# Patient Record
Sex: MALE | Race: WHITE | Employment: FULL TIME | URBAN - METROPOLITAN AREA
[De-identification: names, ages, dates, MRNs, and addresses within clinical notes are randomized per-mention and may not be internally consistent; named-entity substitution may affect disease eponyms.]

---

## 2018-11-27 PROCEDURE — 88307 TISSUE EXAM BY PATHOLOGIST: CPT | Performed by: SURGERY

## 2018-12-10 PROBLEM — D03.61 MELANOMA IN SITU OF RIGHT UPPER ARM (HCC): Status: ACTIVE | Noted: 2018-12-10

## 2024-12-09 NOTE — H&P (VIEW-ONLY)
Orthopaedic Foot & Ankle Surgery  [facility]  Patient:  Chad Roper   :  1960 - 64 year old male   DULANDRY Medical Record: [id number]   Date of Service:  2024   CHIEF COMPLAINT / REASON FOR VISIT   This is a NEW PATIENT VISIT for RIGHT ANKLE and HINDFOOT PAIN  Patient presents with:  Follow - Up: PREOP-Patient is scheduled for RIGHT FOOT RECONSTRUCTION WITH SUBTALAR AND TARSOMETATARSAL ARTHRODESIS, DEEP TENDON TRANSFER AND GASTROCNEMIUS RECESSION 2024 @ [facility].       History of Present Illness   The patient describes their complaint as follows:   Location: POSTERIOR, MEDIAL ANKLE  Quality: SHARP, THROBBING, SHOOTING  Severity: Baseline of 3/10 ranging between 0/10 to 7/10   Duration: Began 6 WEEK(S) ago. Onset associated with  INCREASING WITH ACTIVITY, MOWING THE LAWN CAUSED SIGNIFICANT TRANSIENT INCREASE RECENTLY  Timing: ACTIVITY RELATED   Context: The location has been WITHOUT CHANGE. The quality has been WITHOUT CHANGE. The severity has been WAXING AND WANING  Modifying Factors: improves with REST AND AVOIDING ACTIVITIES THAT CAUSE PAIN,worsens with DAILY ACTIVITIES, PARTICULARLY WALKING, TRAVERSING UNEVEN GROUND OUTDOORS, RECREATION AND EXERCISE.  Associated Signs/Symptoms:  SWELLING and TENDERNESS also occurs with this complaint.  Interval History for 2024 visit   Since the last evaluation 1 MONTH(S) ago, the following changes in the HPI were reported:  Improved, with reduced pain, swelling and better function  Still uses cam boot, has otc orthotics  Interval History for 2024 visit   Since the last evaluation 3 MONTH(S) ago, the following changes in the HPI were reported:  Recent PT note documents:  Pain: 7/10 with yardwork; 4/10 with 1-2 mile walks  Pt. Reports: Still has the pain in the sinus tarsi region of the right foot. It is not as much, possibly due to decreased frequency of the exercises. Feels it when taking walks, and limps at the end of the day. Worked in  the yard all day and pain level increased. The   Activities: health club (hasn't been in 3 months) - stair master, weight-lifting, walking on track for 1 mile  He also notes the flattening of the foot rubs against the orthotic at he posterior aspect of the navicular pad. NO longer has pain along PT tendon, it is all in the sinus tarsi  Interval History for 12/9/2024 visit   Since the last evaluation 1 WEEK(S) ago, the following changes in the HPI were reported:  Continues to experience pain and swelling with daily activities in the sinus tarsi  Back to consider options    Past Medical History     Past Medical History:   Diagnosis Date   • Melanoma in situ (HCC) 2018    right arm  Bro   • Other and unspecified hyperlipidemia      HISTORY OF VENOUS THROMBOEMBOLIC DISEASE: NONE REPORTED  Past Surgical History     Past Surgical History:   Procedure Laterality Date   • OTHER SURGICAL HISTORY  2005    knee arthroscopy   • VASECTOMY       Social and Family History   Social History    Tobacco Use      Smoking status: Never      Smokeless tobacco: Never    Alcohol use: Yes      Alcohol/week: 12.5 standard drinks of alcohol      Types: 15 Cans of beer per week    Drug use: No    Family History   Problem Relation Age of Onset   • Heart Disorder Father         valve surgey   • Other (Other) Father         PUD   • Cancer Mother         thyroid     Current Medications     Current Outpatient Medications   Medication Sig Dispense Refill   • rosuvastatin 40 MG Oral Tab Take 1 tablet (40 mg total) by mouth nightly. 90 tablet 3   • ibuprofen 200 MG Oral Tab Take 200 mg by mouth every 6 (six) hours as needed for Pain. (Patient not taking: Reported on 12/9/2024)     • Desoximetasone 0.25 % External Cream Apply to aa's on the hands BID PRN (Patient not taking: Reported on 12/9/2024) 120 g 1     Allergies     Biaxin [Clarithromy*    ANAPHYLAXIS, SWELLING  Review Of Systems   10 point review of systems was noncontributory except for  pertinent positives and negatives noted in the the HPI.  Physical Examination   VITALS: @ BMI: Estimated body mass index is 28.49 kg/m² as calculated from the following:    Height as of 6/28/23: 5' 10.25\" (1.784 m).    Weight as of 8/7/24: 200 lb (90.7 kg).  Constitutional: Patient is well-developed, well-nourished, and in no distress.   HENT: Normocephalic and atraumatic. Moist mucous membranes   Eyes: Clear Conjunctivae without discharge, bilateral. No scleral icterus, bilateral.   Neck: Neck supple. No JVD, Tracheal deviation visible or thyromegaly visible.  Cardiovascular: Normal pulses  Pulmonary/Chest: Effort normal. No audible stridor or wheezes  No respiratory distress  Abdominal: No visible distension  Neurological: Alert and oriented to person, place, and time. GCS score is 15.   Skin: Warm, dry Normal turgor non-diaphoretic. No rashes. No erythema. No pallor.   Psychiatric: Mood, memory, affect and judgment normal.    MUSCULOSKELETAL: The affected extremity was examined with/without the contralateral for comparison:  POSTERIOR TIBIAL TENDON RIGHT   Foot Alignment Pes Planovalgus   Foot Deformity Fully Correctable   Synovitis / Swelling  MILD   Sinus Tarsi Impingement NONE   CalcaneoFibular Abutment NONE   Tenderness    Navicular Tuberosity Non-Tender to Palpation   Inframalleolar Non-Tender to Palpation   Retromalleolar Tender to Palpation   Posteromedial Tibia Exquisite Tenderness   Strength    Inversion DIMINISHED 4/5   Pain    Pain-Resisted Inversion MODERATE   Pain-Heel Rise MODERATE   Specific Tests    Inversion on  Heel Rise No   Silfverskiold Test Gastrocnemius Contracture       NEUROVASCULAR EXAM RIGHT   Pulses    Dorsalis Pedis Palpable Strong 2/2   Posterior Tibal Palpable Strong 2/2   Sensation    Foot Intact throughout   Ankle Intact throughout   Plantar Sensation N/A     Interval Physical Exam for 8/28/2024 visit   Since the last evaluation 1 MONTH(S) ago, the following changes in the  Physical Examination have occurred:  Mild to minimal tenderness and swelling  NO pain on SHR  Interval Physical Exam for 12/2/2024 visit   Since the last evaluation 3 MONTH(S) ago, the following changes in the Physical Examination have occurred:  NO changes although visibly, there is significant abduction and arch collapse when standing  Interval Physical Exam for 12/9/2024 visit   Since the last evaluation 1 WEEK(S) ago, the following changes in the Physical Examination have occurred:  Severe foot deformity, increased hindfoot valgus, midfoot abduction and medial column varus.  No tenderness at PT tendon, but no function   2-3/5 inversion strength when isolating out recruitment of AT tendon    XRAY & ADVANCED IMAGING INTERPRETATION    Independent Interpretation of Prior Imaging: NO PRIOR IMAGING STUDIES WERE REVIEWED    XR FOOT (2 VIEW), RIGHT (CPT=73620)  I ordered and reviewed the following imaging studies: Foot 2 views FWB   Right  Finding(s):  Pes Planovaglus Foot Deformity - Moderate to severe with arch   flattening and abduction and First TMT Joint Subluxation - dorsal   translation moderate  Interpretation(s):  Deformity - moderate to severe pes planovalgus with   arch depression and midfoot abduction    DIAGNOSES:   Diagnoses and all orders for this visit:    Posterior tibial tendon dysfunction, right  -     MISC DURABLE MEDICAL EQUIP  -     DME - EXTERNAL ; Future    Deformity of right foot    Gastrocnemius equinus of right lower extremity    Instability of right foot joint      DME dispensed at this encounter - Immobilization type: N/A NO ASSISTIVE AIDS  PATIENT ASSESSMENT & MEDICAL DECISION-MAKING:   WE DISCUSSED RECONSTRUCTIVE SURGERY OF THE FOOT TO CORRECT THE ALIGNMENT, REDUCE/ELIMINATE PAIN AND IMPROVE FUNCTION.  STJ and TMTJ1 realignment and arthrodesis, FDL to navicular tendon transfer and gastrocnemius recession    We had a long, detailed and individualized discussion of non-operative and  operative options.The indications for treatment and  the suitability of each option for the patient's condition and goals of treatment were included  Surgery was discussed - risks and benefits, pros and cons time course for healing and functional recovery and expectations for the final outcome. I specifically explained the risks of wound and bone healing difficulties, infection, venous thromboembolism and how the postoperative care plan intends to minimize their occurrence.  In my assessment, reconstructive foot surgery  is the most appropriate option  This recommendation will be considered and patient will return at their choosing for further care      The total time I spent during today's encounter was more than 30 minutes. This time includes review of medical records, interview and examination, independently interpreting results, counseling and education, ordering medications, tests and/or procedures, coordination and/or communicating with other medical providers and documenting the encounter in the EMR. All the patient's questions were answered and the patient voiced understanding of their health issues and a willingness to proceed with the treatment we agreed upon.          Chu Eduardo MD, [facility] Certified Orthopedic Surgeon - Foot & Ankle Subspecialist  [phone] Scheduling Line    I, Chu Eduardo MD performed all aspects of the evaluation and management  of this encounter. I diagnosed the patient and formulated the treatment options. This information was presented to and discussed with the patient including risk and benefits. All questions were answered and the patient acknowledged understanding The patient, with my input, determined how to proceed.

## 2024-12-11 RX ORDER — IBUPROFEN 200 MG
200 TABLET ORAL EVERY 6 HOURS PRN
COMMUNITY

## 2024-12-11 RX ORDER — ROSUVASTATIN CALCIUM 40 MG/1
40 TABLET, COATED ORAL NIGHTLY
COMMUNITY

## 2024-12-30 ENCOUNTER — ANESTHESIA EVENT (OUTPATIENT)
Dept: SURGERY | Facility: HOSPITAL | Age: 64
End: 2024-12-30
Payer: COMMERCIAL

## 2024-12-31 ENCOUNTER — HOSPITAL ENCOUNTER (OUTPATIENT)
Facility: HOSPITAL | Age: 64
Discharge: HOME | End: 2025-01-01
Attending: ORTHOPAEDIC SURGERY | Admitting: ORTHOPAEDIC SURGERY
Payer: COMMERCIAL

## 2024-12-31 ENCOUNTER — APPOINTMENT (OUTPATIENT)
Dept: GENERAL RADIOLOGY | Facility: HOSPITAL | Age: 64
End: 2024-12-31
Attending: ORTHOPAEDIC SURGERY
Payer: COMMERCIAL

## 2024-12-31 ENCOUNTER — ANESTHESIA (OUTPATIENT)
Dept: SURGERY | Facility: HOSPITAL | Age: 64
End: 2024-12-31
Payer: COMMERCIAL

## 2024-12-31 PROBLEM — M76.821 POSTERIOR TIBIAL TENDON DYSFUNCTION, RIGHT: Status: ACTIVE | Noted: 2024-12-31

## 2024-12-31 PROCEDURE — 0SGH07Z FUSION OF RIGHT TARSAL JOINT WITH AUTOLOGOUS TISSUE SUBSTITUTE, OPEN APPROACH: ICD-10-PCS | Performed by: ORTHOPAEDIC SURGERY

## 2024-12-31 PROCEDURE — 76000 FLUOROSCOPY <1 HR PHYS/QHP: CPT | Performed by: ORTHOPAEDIC SURGERY

## 2024-12-31 PROCEDURE — 0SGH04Z FUSION OF RIGHT TARSAL JOINT WITH INTERNAL FIXATION DEVICE, OPEN APPROACH: ICD-10-PCS | Performed by: ORTHOPAEDIC SURGERY

## 2024-12-31 PROCEDURE — 0L8N0ZZ DIVISION OF RIGHT LOWER LEG TENDON, OPEN APPROACH: ICD-10-PCS | Performed by: ORTHOPAEDIC SURGERY

## 2024-12-31 PROCEDURE — 0LXV0ZZ TRANSFER RIGHT FOOT TENDON, OPEN APPROACH: ICD-10-PCS | Performed by: ORTHOPAEDIC SURGERY

## 2024-12-31 PROCEDURE — 76942 ECHO GUIDE FOR BIOPSY: CPT | Performed by: ANESTHESIOLOGY

## 2024-12-31 PROCEDURE — 0SGK04Z FUSION OF RIGHT TARSOMETATARSAL JOINT WITH INTERNAL FIXATION DEVICE, OPEN APPROACH: ICD-10-PCS | Performed by: ORTHOPAEDIC SURGERY

## 2024-12-31 DEVICE — K WIRE 2X150MM TRCR PT SS: Type: IMPLANTABLE DEVICE | Site: FOOT

## 2024-12-31 DEVICE — IMPLANTABLE DEVICE: Type: IMPLANTABLE DEVICE | Site: FOOT

## 2024-12-31 DEVICE — IMPLANTABLE DEVICE: Type: IMPLANTABLE DEVICE | Site: FOOT | Status: FUNCTIONAL

## 2024-12-31 RX ORDER — SODIUM PHOSPHATE, DIBASIC AND SODIUM PHOSPHATE, MONOBASIC 7; 19 G/230ML; G/230ML
1 ENEMA RECTAL ONCE AS NEEDED
Status: DISCONTINUED | OUTPATIENT
Start: 2024-12-31 | End: 2025-01-01

## 2024-12-31 RX ORDER — HYDROCODONE BITARTRATE AND ACETAMINOPHEN 5; 325 MG/1; MG/1
1 TABLET ORAL ONCE AS NEEDED
Status: DISCONTINUED | OUTPATIENT
Start: 2024-12-31 | End: 2024-12-31 | Stop reason: HOSPADM

## 2024-12-31 RX ORDER — HYDROMORPHONE HYDROCHLORIDE 1 MG/ML
0.2 INJECTION, SOLUTION INTRAMUSCULAR; INTRAVENOUS; SUBCUTANEOUS EVERY 5 MIN PRN
Status: DISCONTINUED | OUTPATIENT
Start: 2024-12-31 | End: 2024-12-31 | Stop reason: HOSPADM

## 2024-12-31 RX ORDER — NALOXONE HYDROCHLORIDE 0.4 MG/ML
0.08 INJECTION, SOLUTION INTRAMUSCULAR; INTRAVENOUS; SUBCUTANEOUS AS NEEDED
Status: DISCONTINUED | OUTPATIENT
Start: 2024-12-31 | End: 2024-12-31 | Stop reason: HOSPADM

## 2024-12-31 RX ORDER — ACETAMINOPHEN 325 MG/1
650 TABLET ORAL EVERY 4 HOURS PRN
Status: DISCONTINUED | OUTPATIENT
Start: 2024-12-31 | End: 2025-01-01

## 2024-12-31 RX ORDER — POLYETHYLENE GLYCOL 3350 17 G/17G
17 POWDER, FOR SOLUTION ORAL DAILY PRN
Status: DISCONTINUED | OUTPATIENT
Start: 2024-12-31 | End: 2025-01-01

## 2024-12-31 RX ORDER — KETOROLAC TROMETHAMINE 30 MG/ML
INJECTION, SOLUTION INTRAMUSCULAR; INTRAVENOUS AS NEEDED
Status: DISCONTINUED | OUTPATIENT
Start: 2024-12-31 | End: 2024-12-31 | Stop reason: SURG

## 2024-12-31 RX ORDER — DEXAMETHASONE SODIUM PHOSPHATE 4 MG/ML
VIAL (ML) INJECTION AS NEEDED
Status: DISCONTINUED | OUTPATIENT
Start: 2024-12-31 | End: 2024-12-31 | Stop reason: SURG

## 2024-12-31 RX ORDER — HYDROMORPHONE HYDROCHLORIDE 1 MG/ML
0.6 INJECTION, SOLUTION INTRAMUSCULAR; INTRAVENOUS; SUBCUTANEOUS EVERY 5 MIN PRN
Status: DISCONTINUED | OUTPATIENT
Start: 2024-12-31 | End: 2024-12-31 | Stop reason: HOSPADM

## 2024-12-31 RX ORDER — ACETAMINOPHEN 500 MG
1000 TABLET ORAL ONCE
Status: DISCONTINUED | OUTPATIENT
Start: 2024-12-31 | End: 2024-12-31 | Stop reason: HOSPADM

## 2024-12-31 RX ORDER — DOCUSATE SODIUM 100 MG/1
100 CAPSULE, LIQUID FILLED ORAL 2 TIMES DAILY
Status: DISCONTINUED | OUTPATIENT
Start: 2024-12-31 | End: 2025-01-01

## 2024-12-31 RX ORDER — TAMSULOSIN HYDROCHLORIDE 0.4 MG/1
0.4 CAPSULE ORAL DAILY
Status: DISCONTINUED | OUTPATIENT
Start: 2024-12-31 | End: 2025-01-01

## 2024-12-31 RX ORDER — LIDOCAINE HYDROCHLORIDE 10 MG/ML
INJECTION, SOLUTION EPIDURAL; INFILTRATION; INTRACAUDAL; PERINEURAL AS NEEDED
Status: DISCONTINUED | OUTPATIENT
Start: 2024-12-31 | End: 2024-12-31 | Stop reason: SURG

## 2024-12-31 RX ORDER — ONDANSETRON 2 MG/ML
4 INJECTION INTRAMUSCULAR; INTRAVENOUS EVERY 6 HOURS PRN
Status: DISCONTINUED | OUTPATIENT
Start: 2024-12-31 | End: 2024-12-31 | Stop reason: HOSPADM

## 2024-12-31 RX ORDER — HYDROMORPHONE HYDROCHLORIDE 1 MG/ML
0.8 INJECTION, SOLUTION INTRAMUSCULAR; INTRAVENOUS; SUBCUTANEOUS EVERY 2 HOUR PRN
Status: DISCONTINUED | OUTPATIENT
Start: 2024-12-31 | End: 2025-01-01

## 2024-12-31 RX ORDER — OXYCODONE HYDROCHLORIDE 5 MG/1
5 TABLET ORAL EVERY 4 HOURS PRN
Status: DISCONTINUED | OUTPATIENT
Start: 2024-12-31 | End: 2025-01-01

## 2024-12-31 RX ORDER — OXYCODONE HYDROCHLORIDE 10 MG/1
10 TABLET ORAL EVERY 4 HOURS PRN
Status: DISCONTINUED | OUTPATIENT
Start: 2024-12-31 | End: 2025-01-01

## 2024-12-31 RX ORDER — SODIUM CHLORIDE, SODIUM LACTATE, POTASSIUM CHLORIDE, CALCIUM CHLORIDE 600; 310; 30; 20 MG/100ML; MG/100ML; MG/100ML; MG/100ML
INJECTION, SOLUTION INTRAVENOUS CONTINUOUS
Status: DISCONTINUED | OUTPATIENT
Start: 2024-12-31 | End: 2024-12-31 | Stop reason: HOSPADM

## 2024-12-31 RX ORDER — HYDROMORPHONE HYDROCHLORIDE 1 MG/ML
0.4 INJECTION, SOLUTION INTRAMUSCULAR; INTRAVENOUS; SUBCUTANEOUS EVERY 5 MIN PRN
Status: DISCONTINUED | OUTPATIENT
Start: 2024-12-31 | End: 2024-12-31 | Stop reason: HOSPADM

## 2024-12-31 RX ORDER — ONDANSETRON 2 MG/ML
INJECTION INTRAMUSCULAR; INTRAVENOUS AS NEEDED
Status: DISCONTINUED | OUTPATIENT
Start: 2024-12-31 | End: 2024-12-31 | Stop reason: SURG

## 2024-12-31 RX ORDER — MIDAZOLAM HYDROCHLORIDE 1 MG/ML
INJECTION INTRAMUSCULAR; INTRAVENOUS AS NEEDED
Status: DISCONTINUED | OUTPATIENT
Start: 2024-12-31 | End: 2024-12-31 | Stop reason: SURG

## 2024-12-31 RX ORDER — SODIUM CHLORIDE, SODIUM LACTATE, POTASSIUM CHLORIDE, CALCIUM CHLORIDE 600; 310; 30; 20 MG/100ML; MG/100ML; MG/100ML; MG/100ML
INJECTION, SOLUTION INTRAVENOUS CONTINUOUS
Status: DISCONTINUED | OUTPATIENT
Start: 2024-12-31 | End: 2025-01-01

## 2024-12-31 RX ORDER — BISACODYL 10 MG
10 SUPPOSITORY, RECTAL RECTAL
Status: DISCONTINUED | OUTPATIENT
Start: 2024-12-31 | End: 2025-01-01

## 2024-12-31 RX ORDER — TAMSULOSIN HYDROCHLORIDE 0.4 MG/1
0.4 CAPSULE ORAL DAILY
COMMUNITY
Start: 2024-12-30

## 2024-12-31 RX ORDER — DEXAMETHASONE SODIUM PHOSPHATE 10 MG/ML
INJECTION, SOLUTION INTRAMUSCULAR; INTRAVENOUS AS NEEDED
Status: DISCONTINUED | OUTPATIENT
Start: 2024-12-31 | End: 2024-12-31 | Stop reason: SURG

## 2024-12-31 RX ORDER — PROCHLORPERAZINE EDISYLATE 5 MG/ML
5 INJECTION INTRAMUSCULAR; INTRAVENOUS EVERY 8 HOURS PRN
Status: DISCONTINUED | OUTPATIENT
Start: 2024-12-31 | End: 2024-12-31 | Stop reason: HOSPADM

## 2024-12-31 RX ORDER — SENNOSIDES 8.6 MG
17.2 TABLET ORAL NIGHTLY
Status: DISCONTINUED | OUTPATIENT
Start: 2024-12-31 | End: 2025-01-01

## 2024-12-31 RX ORDER — ACETAMINOPHEN 500 MG
1000 TABLET ORAL ONCE AS NEEDED
Status: DISCONTINUED | OUTPATIENT
Start: 2024-12-31 | End: 2024-12-31 | Stop reason: HOSPADM

## 2024-12-31 RX ORDER — MEPERIDINE HYDROCHLORIDE 25 MG/ML
12.5 INJECTION INTRAMUSCULAR; INTRAVENOUS; SUBCUTANEOUS AS NEEDED
Status: DISCONTINUED | OUTPATIENT
Start: 2024-12-31 | End: 2024-12-31 | Stop reason: HOSPADM

## 2024-12-31 RX ORDER — HYDROCODONE BITARTRATE AND ACETAMINOPHEN 5; 325 MG/1; MG/1
2 TABLET ORAL ONCE AS NEEDED
Status: DISCONTINUED | OUTPATIENT
Start: 2024-12-31 | End: 2024-12-31 | Stop reason: HOSPADM

## 2024-12-31 RX ORDER — MIDAZOLAM HYDROCHLORIDE 1 MG/ML
1 INJECTION INTRAMUSCULAR; INTRAVENOUS EVERY 5 MIN PRN
Status: DISCONTINUED | OUTPATIENT
Start: 2024-12-31 | End: 2024-12-31 | Stop reason: HOSPADM

## 2024-12-31 RX ORDER — ROPIVACAINE HYDROCHLORIDE 5 MG/ML
INJECTION, SOLUTION EPIDURAL; INFILTRATION; PERINEURAL AS NEEDED
Status: DISCONTINUED | OUTPATIENT
Start: 2024-12-31 | End: 2024-12-31 | Stop reason: SURG

## 2024-12-31 RX ORDER — ROSUVASTATIN CALCIUM 20 MG/1
40 TABLET, COATED ORAL NIGHTLY
Status: DISCONTINUED | OUTPATIENT
Start: 2024-12-31 | End: 2025-01-01

## 2024-12-31 RX ORDER — HYDROMORPHONE HYDROCHLORIDE 1 MG/ML
0.4 INJECTION, SOLUTION INTRAMUSCULAR; INTRAVENOUS; SUBCUTANEOUS EVERY 2 HOUR PRN
Status: DISCONTINUED | OUTPATIENT
Start: 2024-12-31 | End: 2025-01-01

## 2024-12-31 RX ORDER — SCOLOPAMINE TRANSDERMAL SYSTEM 1 MG/1
1 PATCH, EXTENDED RELEASE TRANSDERMAL ONCE
Status: DISCONTINUED | OUTPATIENT
Start: 2024-12-31 | End: 2024-12-31 | Stop reason: HOSPADM

## 2024-12-31 RX ORDER — ENOXAPARIN SODIUM 100 MG/ML
40 INJECTION SUBCUTANEOUS DAILY
Status: DISCONTINUED | OUTPATIENT
Start: 2024-12-31 | End: 2025-01-01

## 2024-12-31 RX ADMIN — ONDANSETRON 4 MG: 2 INJECTION INTRAMUSCULAR; INTRAVENOUS at 11:02:00

## 2024-12-31 RX ADMIN — ROPIVACAINE HYDROCHLORIDE 25 ML: 5 INJECTION, SOLUTION EPIDURAL; INFILTRATION; PERINEURAL at 08:31:00

## 2024-12-31 RX ADMIN — ROPIVACAINE HYDROCHLORIDE 15 ML: 5 INJECTION, SOLUTION EPIDURAL; INFILTRATION; PERINEURAL at 08:35:00

## 2024-12-31 RX ADMIN — LIDOCAINE HYDROCHLORIDE 10 MG: 10 INJECTION, SOLUTION EPIDURAL; INFILTRATION; INTRACAUDAL; PERINEURAL at 08:35:00

## 2024-12-31 RX ADMIN — DEXAMETHASONE SODIUM PHOSPHATE 2 MG: 10 INJECTION, SOLUTION INTRAMUSCULAR; INTRAVENOUS at 08:35:00

## 2024-12-31 RX ADMIN — KETOROLAC TROMETHAMINE 30 MG: 30 INJECTION, SOLUTION INTRAMUSCULAR; INTRAVENOUS at 11:02:00

## 2024-12-31 RX ADMIN — DEXAMETHASONE SODIUM PHOSPHATE 2 MG: 10 INJECTION, SOLUTION INTRAMUSCULAR; INTRAVENOUS at 08:31:00

## 2024-12-31 RX ADMIN — MIDAZOLAM HYDROCHLORIDE 2 MG: 1 INJECTION INTRAMUSCULAR; INTRAVENOUS at 08:24:00

## 2024-12-31 RX ADMIN — SODIUM CHLORIDE, SODIUM LACTATE, POTASSIUM CHLORIDE, CALCIUM CHLORIDE: 600; 310; 30; 20 INJECTION, SOLUTION INTRAVENOUS at 08:24:00

## 2024-12-31 RX ADMIN — LIDOCAINE HYDROCHLORIDE 10 MG: 10 INJECTION, SOLUTION EPIDURAL; INFILTRATION; INTRACAUDAL; PERINEURAL at 08:31:00

## 2024-12-31 RX ADMIN — DEXAMETHASONE SODIUM PHOSPHATE 8 MG: 4 MG/ML VIAL (ML) INJECTION at 08:40:00

## 2024-12-31 RX ADMIN — SODIUM CHLORIDE, SODIUM LACTATE, POTASSIUM CHLORIDE, CALCIUM CHLORIDE: 600; 310; 30; 20 INJECTION, SOLUTION INTRAVENOUS at 10:43:00

## 2024-12-31 NOTE — SPIRITUAL CARE NOTE
Spiritual Care Visit Note    Patient Name: Chad Roper Date of Spiritual Care Visit: 24   : 1960 Primary Dx: <principal problem not specified>       Referred By:      Spiritual Care Taxonomy:    Intended Effects: Build relationship of care and support    Methods: Offer support    Interventions: Acknowledge current situation    Visit Type/Summary:     - Spiritual Care: Patient declined a  visit at this time.  remains available as needed for follow up.    Spiritual Care support can be requested via an Epic consult. For urgent/immediate needs, please contact the On Call  at: Edward: ext [phone]    Pr. Gregoria Henderson Mdiv.

## 2024-12-31 NOTE — ANESTHESIA PROCEDURE NOTES
Airway  Date/Time: 12/31/2024 8:36 AM  Urgency: elective    Airway not difficult    General Information and Staff    Patient location during procedure: OR  Anesthesiologist: Esdars Guzmán MD  Resident/CRNA: Jeffrey Perry CRNA  Performed: anesthesiologist   Performed by: Jeffrey Perry CRNA  Authorized by: Esdras Guzmán MD      Indications and Patient Condition  Indications for airway management: anesthesia  Spontaneous Ventilation: absent  Sedation level: deep  Preoxygenated: yes  Patient position: sniffing  MILS maintained throughout  Mask difficulty assessment: 1 - vent by mask  Planned trial extubation    Final Airway Details  Final airway type: supraglottic airway      Successful airway: classic  Size 4       Number of attempts at approach: 1  Number of other approaches attempted: 0    Additional Comments  Dentition per pre op

## 2024-12-31 NOTE — INTERVAL H&P NOTE
Pre-op Diagnosis: POSTERIOR TIBIAL TENDON DYSFUNCTION RIGHT; DEFORMITY OF RIGHT FOOT; GASTROCNEMIUS EQUINUS OF RIGHT LOWER EXTREMITY    The above referenced H&P was reviewed by Chu Eduardo MD on 12/31/2024, the patient was examined and no significant changes have occurred in the patient's condition since the H&P was performed.  I discussed with the patient and/or legal representative the potential benefits, risks and side effects of this procedure; the likelihood of the patient achieving goals; and potential problems that might occur during recuperation.  I discussed reasonable alternatives to the procedure, including risks, benefits and side effects related to the alternatives and risks related to not receiving this procedure.  We will proceed with procedure as planned.

## 2024-12-31 NOTE — ANESTHESIA POSTPROCEDURE EVALUATION
[facility]    Chad Roper Patient Status:  Hospital Outpatient Surgery   Age/Gender 64 year old male MRN [id number]   Location [facility] SURGERY Attending Chu Eduardo MD   Hosp Day # 0 PCP Jn Lawton MD       Anesthesia Post-op Note    RIGHT FOOT RECONSTRUCTION WITH SUBTALAR AND TARSOMETATARSAL ARTHRODESIS, DEEP TENDON TRANSFER AND GASTROCNEMIUS RECESSION    Procedure Summary       Date: 12/31/24 Room / Location:  MAIN OR 12 / [facility] MAIN OR    Anesthesia Start: 0824 Anesthesia Stop: 1131    Procedure: RIGHT FOOT RECONSTRUCTION WITH SUBTALAR AND TARSOMETATARSAL ARTHRODESIS, DEEP TENDON TRANSFER AND GASTROCNEMIUS RECESSION (Right: Foot) Diagnosis: (POSTERIOR TIBIAL TENDON DYSFUNCTION RIGHT; DEFORMITY OF RIGHT FOOT; GASTROCNEMIUS EQUINUS OF RIGHT LOWER EXTREMITY)    Surgeons: Chu Eduardo MD Anesthesiologist: Esdras Guzmán MD    Anesthesia Type: general ASA Status: 2            Anesthesia Type: general    Vitals Value Taken Time   /70 12/31/24 1131   Temp 97 12/31/24 1131   Pulse 53 12/31/24 1131   Resp 16 12/31/24 1131   SpO2 100 12/31/24 1131       Patient Location: PACU    Anesthesia Type: general    Airway Patency: patent and extubated    Postop Pain Control: adequate    Mental Status: preanesthetic baseline    Nausea/Vomiting: none    Cardiopulmonary/Hydration status: stable euvolemic    Complications: no apparent anesthesia related complications    Postop vital signs: stable    Dental Exam: Unchanged from Preop    Patient to be discharged from PACU when criteria met.

## 2024-12-31 NOTE — ANESTHESIA PROCEDURE NOTES
Regional Block    Date/Time: 12/31/2024 8:31 AM    Performed by: Jeffrey Perry CRNA  Authorized by: Esdras Guzmán MD      General Information and Staff    Start Time:  12/31/2024 8:31 AM  Anesthesiologist:  Esdras Guzmán MD  CRNA:  Jeffrey Perry CRNA  Performed by:  CRNA  Patient Location:  OR    Block Placement: Pre Induction  Site Identification: real time ultrasound guided and image stored and retrievable    Block site/laterality marked before start: site marked  Reason for Block: at surgeon's request and post-op pain management    Preanesthetic Checklist: 2 patient identifers, IV checked, site marked, risks and benefits discussed, monitors and equipment checked, pre-op evaluation, timeout performed, anesthesia consent, sterile technique used, no prohibitive neurological deficits and no local skin infection at insertion site      Procedure Details    Patient Position:  Supine  Prep: ChloraPrep    Monitoring:  Cardiac monitor, continuous pulse ox, blood pressure cuff and heart rate  Block Type:  Popliteal  Laterality:  Right  Injection Technique:  Single-shot    Needle    Needle Type:  Short-bevel and echogenic  Needle Gauge:  21 G  Needle Length:  100 mm  Needle Localization:  Ultrasound guidance  Reason for Ultrasound Use: appropriate spread of the medication was noted in real time and no ultrasound evidence of intravascular and/or intraneural injection            Assessment    Injection Assessment:  Good spread noted, negative resistance, negative aspiration for heme, incremental injection, low pressure and local visualized surrounding nerve on ultrasound  Paresthesia Pain:  None  Heart Rate Change: No    - Patient tolerated block procedure well without evidence of immediate block related complications.     Medications  12/31/2024 8:31 AM      Additional Comments    Medication:  Ropivacaine 0.5% 25mL & PF Decadron 2mg

## 2024-12-31 NOTE — ANESTHESIA PROCEDURE NOTES
Regional Block    Date/Time: 12/31/2024 8:35 AM    Performed by: Jeffrey Perry CRNA  Authorized by: Esdras Guzmán MD      General Information and Staff    Start Time:  12/31/2024 8:35 AM  Anesthesiologist:  Esdras Guzmán MD  CRNA:  Jeffrey Perry CRNA  Performed by:  CRNA  Patient Location:  OR    Block Placement: Pre Induction  Site Identification: real time ultrasound guided and image stored and retrievable    Block site/laterality marked before start: site marked  Reason for Block: at surgeon's request and post-op pain management    Preanesthetic Checklist: 2 patient identifers, IV checked, site marked, risks and benefits discussed, monitors and equipment checked, pre-op evaluation, timeout performed, anesthesia consent, sterile technique used, no prohibitive neurological deficits and no local skin infection at insertion site      Procedure Details    Patient Position:  Supine  Prep: ChloraPrep    Monitoring:  Cardiac monitor, continuous pulse ox, blood pressure cuff and heart rate  Block Type:  Femoral  Laterality:  Right  Injection Technique:  Single-shot    Needle    Needle Type:  Short-bevel and echogenic  Needle Gauge:  21 G  Needle Length:  100 mm  Needle Localization:  Ultrasound guidance  Reason for Ultrasound Use: appropriate spread of the medication was noted in real time and no ultrasound evidence of intravascular and/or intraneural injection            Assessment    Injection Assessment:  Good spread noted, negative resistance, negative aspiration for heme, incremental injection, low pressure, local visualized surrounding nerve on ultrasound and no pain on injection  Paresthesia Pain:  None  Heart Rate Change: No    - Patient tolerated block procedure well without evidence of immediate block related complications.     Medications  12/31/2024 8:35 AM      Additional Comments    Medication:  Ropivacaine 0.5% 15mL & PF Decadron

## 2024-12-31 NOTE — PROGRESS NOTES
Patient admitted via bed from PACU. Oriented to room. Safety precautions initiated. Call light in reach. Spouse at bedside. Vitals stable. Denies pain. RLE elevated above heart level on pillows and blankets. Plan of care discussed with patient and spouse.

## 2024-12-31 NOTE — BRIEF OP NOTE
Orthopedic Surgery Brief Operative Note:    Patient Name: Chad Roper  Age:  64 year old  Sex: male  MRN: [id number]  : 1960  Date of Admission: 2024  Date of Surgery: 24  Primary Surgeon: Chu Eduardo MD  Assistant(s): ethan trevino    Preoperative Diagnosis: POSTERIOR TIBIAL TENDON DYSFUNCTION RIGHT; DEFORMITY OF RIGHT FOOT; GASTROCNEMIUS EQUINUS OF RIGHT LOWER EXTREMITY  Postoperative Diagnosis: POSTERIOR TIBIAL TENDON DYSFUNCTION RIGHT; DEFORMITY OF RIGHT FOOT; GASTROCNEMIUS EQUINUS OF RIGHT LOWER EXTREMITY  Procedure Name: RIGHT FOOT RECONSTRUCTION WITH SUBTALAR AND TARSOMETATARSAL ARTHRODESIS, DEEP TENDON TRANSFER AND GASTROCNEMIUS RECESSION:   Anesthesia: GETA with Femoral Sciatic Block  Tourniquet time:   Total Tourniquet Time Documented:  Thigh (Right) - 135 minutes  Total: Thigh (Right) - 135 minutes    Fluids: 1L  EBL: 25 mL   Findings: Correctable hindfoot valgus and forefoot varus with ruptured posterior tibial tendon and contracted gastrocnemius  Implants/Specimens: Synthes 6.5 mm cannulated screws and 3.5 cortical screws  Drapes final count correct: Yes  Complications: NONE apparent  Disposition: to PACU    Chu Eduardo MD  [facility]  Orthopedic Surgery,  Foot & Ankle

## 2024-12-31 NOTE — ANESTHESIA PREPROCEDURE EVALUATION
PRE-OP EVALUATION    Patient Name: Chad Roper    Admit Diagnosis: POSTERIOR TIBIAL TENDON DYSFUNCTION RIGHT; DEFORMITY OF RIGHT FOOT; GASTROCNEMIUS EQUINUS OF RIGHT LOWER EXTREMITY    Pre-op Diagnosis: POSTERIOR TIBIAL TENDON DYSFUNCTION RIGHT; DEFORMITY OF RIGHT FOOT; GASTROCNEMIUS EQUINUS OF RIGHT LOWER EXTREMITY    RIGHT FOOT RECONSTRUCTION WITH SUBTALAR AND TARSOMETATARSAL ARTHRODESIS, DEEP TENDON TRANSFER AND GASTROCNEMIUS RECESSION    Anesthesia Procedure: RIGHT FOOT RECONSTRUCTION WITH SUBTALAR AND TARSOMETATARSAL ARTHRODESIS, DEEP TENDON TRANSFER AND GASTROCNEMIUS RECESSION (Right)    Surgeons and Role:     * Chu Eduardo MD - Primary    Pre-op vitals reviewed.  Temp: 97.1 °F (36.2 °C)  Pulse: 46  Resp: 16  BP: 132/74  SpO2: 98 %  Body mass index is 28.73 kg/m².    Current medications reviewed.  Hospital Medications:   [Transfer Hold] acetaminophen (Tylenol Extra Strength) tab 1,000 mg  1,000 mg Oral Once    [Transfer Hold] scopolamine (Transderm-Scop) 1 MG/3DAYS patch 1 patch  1 patch Transdermal Once    lactated ringers infusion   Intravenous Continuous    ceFAZolin (Ancef) 2g in 10mL IV syringe premix  2 g Intravenous Once       Outpatient Medications:   Prescriptions Prior to Admission[1]    Allergies: Biaxin [clarithromycin]      Anesthesia Evaluation    Patient summary reviewed.    Anesthetic Complications  (-) history of anesthetic complications         GI/Hepatic/Renal                                 Cardiovascular                     (+) hyperlipidemia                                  Endo/Other                                  Pulmonary                           Neuro/Psych                                      Past Surgical History:   Procedure Laterality Date    Colonoscopy      Other surgical history  01/01/2005    knee arthroscopy    Vasectomy       Social History     Socioeconomic History    Marital status:    Tobacco Use    Smoking status: Never    Smokeless tobacco:  Never   Vaping Use    Vaping status: Never Used   Substance and Sexual Activity    Alcohol use: Yes     Alcohol/week: 12.5 standard drinks of alcohol     Types: 15 Cans of beer per week    Drug use: No   Other Topics Concern    Exercise Yes     Comment: 10 miles week     History   Drug Use No     Available pre-op labs reviewed.               Airway      Mallampati: I  Mouth opening: >3 FB  TM distance: > 6 cm  Neck ROM: full Cardiovascular             Dental    Dentition appears grossly intact         Pulmonary                     Other findings              ASA: 2   Plan: general  NPO status verified and           Plan/risks discussed with: patient                Present on Admission:  **None**             [1]   Medications Prior to Admission   Medication Sig Dispense Refill Last Dose/Taking    rosuvastatin 40 MG Oral Tab Take 1 tablet (40 mg total) by mouth nightly.   12/30/2024    ibuprofen 200 MG Oral Tab Take 1 tablet (200 mg total) by mouth every 6 (six) hours as needed for Pain.   12/17/2024    Desoximetasone 0.25 % External Cream Apply to aa's on the hands BID PRN 60 g 3 Taking

## 2024-12-31 NOTE — OPERATIVE REPORT
[facility]    PATIENT'S NAME: CLARITA ROBERSON   ATTENDING PHYSICIAN: Chu Eduardo MD   OPERATING PHYSICIAN: Chu Eduardo MD   PATIENT ACCOUNT#:   [id number]    LOCATION:  [address] [place] [address]  MEDICAL RECORD #:   [id number]       YOB: 1960  ADMISSION DATE:       12/31/2024      OPERATION DATE:  12/31/2024    OPERATIVE REPORT      PREOPERATIVE DIAGNOSIS:    1.   Right posterior tibial tendon dysfunction.  2.   Foot deformity, acquired, with hindfoot valgus, forefoot varus and midfoot abduction, right.    3.   Gastrocnemius-derived equinus contracture of the ankle.    POSTOPERATIVE DIAGNOSIS:    1.   Posterior tibial tendon rupture, right.    2.   Foot deformity, acquired, with hindfoot valgus, forefoot varus and midfoot abduction, right.    3.   Gastrocnemius-derived equinus contracture of the ankle.    PROCEDURE:    1.   Arthrodesis, subtalar joint, right (CPT code 24625).   2.   Deep tendon transfer of the flexor digitorum longus to navicular, right (CPT code 61773-48 modifier).    3.   Tarsometatarsal arthrodesis, right (CPT code 31703-96 modifier).    4.   Gastrocnemius recession, right (CPT code 95388-95 modifier).      ASSISTANT:  DORINDA Tubbs.  A skilled and experienced assistant was necessary for the safe and effective performance of the operation.  The assistant participated in positioning, prepping and draping, retraction during the surgical procedure, as well as manipulation, wound closure and dressing application including splint application.    TOURNIQUET TIME:  2 hours and 15 minutes.  COMPLICATIONS:  None apparent.  ANESTHESIA:  General, plus regional.  ESTIMATED BLOOD LOSS:  25 mL.  INTRAVENOUS FLUIDS:  1 L.    INDICATIONS:  The patient has a significantly painful pes planovalgus foot deformity that has been nonresponsive to nonoperative management.  He has persistent pain in the sinus tarsi and medial arch pain that inhibit his activities of daily living and  recreational activity.  An MRI demonstrated a tenopathy of the posterior tibial tendon from the level of the inframalleolar segment to the insertion onto the navicular.  He was thus indicated for the aforementioned procedures.  He gave consent after an opportunity for all his questions to be answered.    FINDINGS:  There was a 15-degree equinus contracture that was resolved with the gastrocnemius recession.  His hindfoot valgus was corrected with the subtalar arthrodesis.  His midfoot abduction was corrected with the deep tendon transfer of the flexor digitorum longus to the navicular, and his forefoot varus deformity was corrected by his first tarsometatarsal arthrodesis.  He now had a plantigrade foot at the conclusion of the case.    Also of note, the posterior tibial tendon was ruptured from its insertion onto the navicular and was degenerated in the inframalleolar segment.  These findings seemed to be more advanced from his previous MRI.    OPERATIVE TECHNIQUE:  The patient was identified in the preoperative holding area, surgical site marked, and history and physical updated.  He was then brought to the operating room, placed supine on the operative table where a general and regional anesthetic were administered.  A tourniquet was placed on his right proximal thigh under copious Webril padding, and the leg was elevated to exsanguinate it during the prep and drape.  Once the time-out was taken, the operation commenced.    Attention first turned to performing the gastrocnemius recession.  This procedure was done by making a medial longitudinal incision at the level of the musculotendinous junction of the gastroc and soleus on the medial side of the leg.  It was carried down through skin and subcutaneous tissues, and the investing fascia over the musculature was incised.  The plane between the gastrocnemius and soleus muscles was bluntly dissected and then exposed using a pediatric vaginal speculum.  The  aponeurosis of both the gastroc and the soleus was incised, keeping the muscles intact.  This dramatically improved his ankle dorsiflexion range of motion past neutral.  This wound was irrigated and closed in layers.  Now with the leg still elevated, the tourniquet was inflated to 300 mmHg.    The foot was gently assisted down to the operative table, and his knee was flexed and internally rotated to expose the lateral aspect of his hindfoot.  Here, a curvilinear incision centered over the sinus tarsi was made and carried down through skin and subcutaneous tissues to identify the peroneal tendons laterally and the peroneus tertius medially.  Also, the extensor digitorum brevis muscle belly was identified and reflected back as a distally based U-shaped flap to expose the sinus tarsi.  I then entered into the sinus tarsi and denuded the posterior and middle facets of the articular cartilage using a combination of hand osteotomes, curettes and rongeurs.  The posterior facet had subchondral drilling, and the local bone graft was retained to facilitate healing.  With the joint prepared for fusion, I now corrected his hindfoot valgus deformity by reducing his subtalar joint.  With the subtalar joint reduced, his hindfoot valgus was now between 5 and 7 degrees.  In this position, I placed the guide pins from the 6.5 cannulated screw set.  One screw went into the lateral aspect of the posterior facet and the second screw would go into the talar neck and head over the middle facet.  The position of these guide pins was confirmed, and then they were drilled, measured and placed with two 6.5 partially-threaded cancellous screws with star-drive heads.    Now, the tourniquet was deflated from the bean bag lateral position to put him in a more supine position so I could expose the medial side of his arch.     Here, a longitudinal incision was made to expose his posterior tibial tendon, navicular, and navicular cuneiform and  talonavicular joint.  His posterior tibial tendon was quite thickened and was essentially ruptured from its insertion onto the navicular.  I continued my dissection and identified the flexor digitorum longus tendon, confirming this by dorsiflexion and plantar flexion of the fourth and fifth toes and seeing tendon excursion with the tendon I had selected.  I dissected that flexor digitorum longus tendon down to the knot of Hal where here I tenotomized it.  I then made a drill hole in the navicular tuberosity and passed the flexor digitorum longus tendon from plantar to dorsal, and with inversion and plantar flexion, corrected his midfoot deformity.  In this position, I sutured the tendon back onto itself with #2 FiberWire suture.  I also repaired the posterior tibial tendon to the FDL tendon as it did have excursion despite its tendinopathy; thus, I did not want to completely excise it.    Now, I assessed his first ray varus.  He was hypermobile in the first ray and it was contributing to persistence of his first ray varus deformity.    Thus, for this reason, I made a dorsal incision over the first tarsometatarsal joint, carried it down in layers in the interval between the EHL and EHB to expose the joint.  I denuded the joint of its articular cartilage and made multiple subchondral perforations of the bone with a drill bit 2.0 mm in diameter.  I intentionally plantarly translated the first metatarsal relative to the medial cuneiform by approximately 3 to 4 mm to create and correct his forefoot varus deformity.  With this done, I then placed two 3.5 cortical screws in lag mode across the joint as the fixation for this arthrodesis of the first tarsometatarsal joint    Final fluoroscopic images were taken to assess the proper placement of the hardware and the correction of the foot, and at this time, the wounds were irrigated and closed in layers.  The patient was placed in a well-molded short-leg splint with the  ankle in neutral position.  He was brought to the recovery room, having tolerated the procedure well.  Of note, all sponge counts and needle counts were correct x2.    DISPOSITION:  The patient will be discharged home if he meets discharge criteria.  Otherwise, he will stay overnight for pain control and observation.  He will receive Lovenox if he stays in-house but has aspirin at home to use as his DVT prophylaxis if he discharges.    Dictated By Chu Eduardo MD  d: 12/31/2024 11:47:26  t: 12/31/2024 15:37:28  Job [id number]  [facility]/

## 2025-01-01 VITALS
DIASTOLIC BLOOD PRESSURE: 65 MMHG | WEIGHT: 206 LBS | BODY MASS INDEX: 28.84 KG/M2 | HEART RATE: 51 BPM | TEMPERATURE: 98 F | SYSTOLIC BLOOD PRESSURE: 126 MMHG | RESPIRATION RATE: 18 BRPM | HEIGHT: 71 IN | OXYGEN SATURATION: 98 %

## 2025-01-01 LAB
ERYTHROCYTE [DISTWIDTH] IN BLOOD BY AUTOMATED COUNT: 12.7 %
HCT VFR BLD AUTO: 39.3 %
HGB BLD-MCNC: 13.5 G/DL
MCH RBC QN AUTO: 33 PG (ref 26–34)
MCHC RBC AUTO-ENTMCNC: 34.4 G/DL (ref 31–37)
MCV RBC AUTO: 96.1 FL
PLATELET # BLD AUTO: 180 10(3)UL (ref 150–450)
RBC # BLD AUTO: 4.09 X10(6)UL
WBC # BLD AUTO: 12.7 X10(3) UL (ref 4–11)

## 2025-01-01 PROCEDURE — 97116 GAIT TRAINING THERAPY: CPT

## 2025-01-01 PROCEDURE — 85027 COMPLETE CBC AUTOMATED: CPT | Performed by: ORTHOPAEDIC SURGERY

## 2025-01-01 PROCEDURE — 97161 PT EVAL LOW COMPLEX 20 MIN: CPT

## 2025-01-01 RX ORDER — ASPIRIN 81 MG/1
81 TABLET, COATED ORAL DAILY
COMMUNITY
Start: 2024-12-09

## 2025-01-01 RX ORDER — PSEUDOEPHEDRINE HCL 30 MG
100 TABLET ORAL 2 TIMES DAILY
COMMUNITY
Start: 2024-12-09

## 2025-01-01 RX ORDER — OXYCODONE HYDROCHLORIDE 5 MG/1
5 TABLET ORAL EVERY 4 HOURS PRN
COMMUNITY
Start: 2024-12-09

## 2025-01-01 RX ORDER — GABAPENTIN 300 MG/1
300 CAPSULE ORAL NIGHTLY
COMMUNITY
Start: 2024-12-09

## 2025-01-01 NOTE — CONSULTS
[facility] Hospitalist Consult Note    Consult Reason: Postop medical management     History of Present Illness: Patient is a 64 year old male with history of hypertension and hyperlipidemia presented for right foot reconstruction.  Patient doing well postoperatively.  No nausea or vomiting.  Pain is adequately controlled.  He denies any acute complaints.    Medical History:  Past Medical History:    Back problem    High cholesterol    Melanoma in situ (HCC)    right arm  Bro    Other and unspecified hyperlipidemia    Visual impairment    glasses      Past Surgical History:   Procedure Laterality Date    Colonoscopy      Other surgical history  01/01/2005    knee arthroscopy    Vasectomy        Social History     Tobacco Use    Smoking status: Never    Smokeless tobacco: Never   Substance Use Topics    Alcohol use: Yes     Alcohol/week: 12.5 standard drinks of alcohol     Types: 15 Cans of beer per week      Family History   Problem Relation Age of Onset    Heart Disorder Father         valve surgey    Other (Other) Father         PUD    Cancer Mother         thyroid       Allergies[1]     Review of Systems  Comprehensive ROS reviewed and negative except for what is stated in HPI.      OBJECTIVE:  /62 (BP Location: Right arm)   Pulse 67   Temp 98.6 °F (37 °C) (Oral)   Resp 20   Ht 5' 11\" (1.803 m)   Wt 206 lb (93.4 kg)   SpO2 93%   BMI 28.73 kg/m²   General: No apparent distress.  Alert and oriented.  HEENT:  EOMI, PERRLA.  Pulm: Clear breath sounds bilaterally.  Normal respiratory effort.  CV: Regular rate and rhythm, no murmur.   Abd: Soft, nontender, nondistended.   MSK: Right lower extremity wrapped and elevated.  Skin: No lesions or rashes.  Neuro: No obvious focal deficits.    LABS:        All lab work and imaging personally reviewed.    Assessment/ Plan:     #Right foot deformity s/p reconstruction  -Monitor hemodynamics postoperatively  -Continue pain control, bowel regimen and antiemetics as  required  -DVT prophylaxis per surgery  -Encourage early mobilization, PT/OT    #Hyperlipidemia  -Resume home rosuvastatin    #BPH  -Resume home tamsulosin    DO Jazzmine [facility] Hospitalist       [1]   Allergies  Allergen Reactions    Biaxin [Clarithromycin] ANAPHYLAXIS and SWELLING

## 2025-01-01 NOTE — PROGRESS NOTES
S: Dwayne tells me he is comfortable - just beginning to get some tingling in his toes.    O: Blood pressure 126/65, pulse 50, temperature 97.7 °F (36.5 °C), temperature source Oral, resp. rate 18, height 5' 11\" (1.803 m), weight 206 lb (93.4 kg), SpO2 98%.  Lab Results   Component Value Date    WBC 12.7 01/01/2025    HGB 13.5 01/01/2025    HCT 39.3 01/01/2025    .0 01/01/2025       Right foot toes with good capillary refill  Foot is adequately elevated well above the heart    A: POD #1        Arthrodesis, subtalar joint, right  2.       Deep tendon transfer of the flexor digitorum longus to navicular, right     3.       Tarsometatarsal arthrodesis, right  4.       Gastrocnemius recession, right     P: DVT prophylaxis, Mobilize with PT, D/C planning for home today    [facility]

## 2025-01-01 NOTE — PLAN OF CARE
A&O x 4. VSS. On RA. No c/o pain this am. Still some decreased sensation to RLE. Post mold/Ace wrap in place. Voiding without issue. PT/OT to see. Plan home later today. Will continue to monitor.

## 2025-01-01 NOTE — PLAN OF CARE
Patient A & O x4. VSS, on RA. Denies pain. C/o headache, tylenol given. Post mold splint to RLE, elevated on pillows. Voiding freely. Safety measures in place. Instructed to use call light.

## 2025-01-01 NOTE — PHYSICAL THERAPY NOTE
PHYSICAL THERAPY EVALUATION - INPATIENT     Room Number: 354/354-A  Evaluation Date: 1/1/2025  Type of Evaluation: Initial  Physician Order: PT Eval and Treat    Presenting Problem: posterior tibial tendon dysfunction  Co-Morbidities : HTN, HLD, BPH  Reason for Therapy: Mobility Dysfunction and Discharge Planning    Operative report 12/31/24:  PROCEDURE:    1.       Arthrodesis, subtalar joint, right (CPT code 76824).   2.       Deep tendon transfer of the flexor digitorum longus to navicular, right (CPT code 47863-06 modifier).    3.       Tarsometatarsal arthrodesis, right (CPT code 15888-50 modifier).    4.       Gastrocnemius recession, right (CPT code 60004-82 modifier).    PHYSICAL THERAPY ASSESSMENT   Patient is a 64 year old male admitted 12/31/2024 for R ankle/foot surgery.   Patient is currently functioning below baseline with bed mobility, transfers, gait, and stair negotiation. Prior to admission, patient's baseline is independent.     Patient will benefit from continued skilled PT Services with no additional skilled services but increased support at home.    PLAN  Patient has been evaluated and presents with no skilled Physical Therapy needs at this time.  Patient discharged from Physical Therapy services.  Please re-order if a new functional limitation presents during this admission.    PT Device Recommendation: Rolling walker    GOALS  Patient was able to achieve the following goals ...    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, works full time in sales    SUBJECTIVE  Pt pleasant and cooperative    OBJECTIVE     Fall Risk: Standard fall risk    WEIGHT BEARING RESTRICTION  R Lower  Extremity: Non-Weight Bearing    PAIN ASSESSMENT  Ratin  Location: denies pain, has nerve block       COGNITION  Overall Cognitive Status:  WFL - within functional limits    RANGE OF MOTION AND STRENGTH ASSESSMENT  Upper extremity ROM and strength are within functional limits     Lower extremity ROM is within functional limits R ankle//foot NT    Lower extremity strength is within functional limits  WNL throughout, R ankle/foot NT    BALANCE  Static Sitting: Good  Dynamic Sitting: Good  Static Standing: Poor +  Dynamic Standing: Poor +    ADDITIONAL TESTS                                    ACTIVITY TOLERANCE  Pulse: 51  Heart Rate Source: Monitor                   O2 WALK  Oxygen Therapy  SPO2% on Room Air at Rest: 100    NEUROLOGICAL FINDINGS                        AM-PAC '6-Clicks' INPATIENT SHORT FORM - BASIC MOBILITY  How much difficulty does the patient currently have...  Patient Difficulty: Turning over in bed (including adjusting bedclothes, sheets and blankets)?: None   Patient Difficulty: Sitting down on and standing up from a chair with arms (e.g., wheelchair, bedside commode, etc.): None   Patient Difficulty: Moving from lying on back to sitting on the side of the bed?: None   How much help from another person does the patient currently need...   Help from Another: Moving to and from a bed to a chair (including a wheelchair)?: None   Help from Another: Need to walk in hospital room?: None   Help from Another: Climbing 3-5 steps with a railing?: None       AM-PAC Score:  Raw Score: 24   Approx Degree of Impairment: 0%   Standardized Score (AM-PAC Scale): 61.14   CMS Modifier (G-Code): CH    FUNCTIONAL ABILITY STATUS  Gait Assessment   Functional Mobility/Gait Assessment  Gait Assistance: Modified independent  Distance (ft): 25  Assistive Device: Rolling walker  Pattern:  (NWB R LE)  Stairs: Stoop/curb  Stoop/Curb: Mod I with RW and crutchesNWB R LE    Skilled Therapy Provided     Bed  Mobility:  Rolling: NT  Supine to sit: independent   Sit to supine: independent     Transfer Mobility:  Sit to stand: Mod I   Stand to sit: Mod I  Gait = Mod I    Therapist's comments:PT orders received, chart reviewed, and RN approved PT session. Pt lying in bed and agreeable to PT. Vitals assessed and WNL. Pt educated in NWB R LE and instructed in use of crutches and RW for mobility. Instructed in hand placement, transfers, and gait sequencing with each device. Pt started with RW, performed well, instructed in stoop training with RW at Mod I. Instructed in crutches, supervision/CGA for sit<>stand, Mod I for amb with crutches and stoop. Increased time spent on education and discussion with pt regarding the importance of safety awareness, fall precautions, activity level and pacing. Also educated in symptom management techniques such as positioning, use of ice, and rest/elevation. Pt in understanding. Pt left in bed, all needs in reach. RN notified.     Exercise/Education Provided:  Bed mobility  Body mechanics  Functional activity tolerated  Gait training  Transfer training    Patient End of Session: In bed;Needs met;Call light within reach;RN aware of session/findings;All patient questions and concerns addressed;Hospital anti-slip socks;SCDs in place    Patient Evaluation Complexity Level:  History Moderate - 1 or 2 personal factors and/or co-morbidities   Examination of body systems Low -  addressing 1-2 elements   Clinical Presentation Low- Stable   Clinical Decision Making Low Complexity       PT Session Time: 25 minutes  Gait Training: 15 minutes  Therapeutic Activity: 0 minutes  Neuromuscular Re-education: 0 minutes  Therapeutic Exercise: 0 minutes

## (undated) DEVICE — SLEEVE COMPR MD KNEE LEN SGL USE KENDALL SCD

## (undated) DEVICE — SPONGE 4X4 10PK

## (undated) DEVICE — LOWER EXTREMITY CDS-LF: Brand: MEDLINE INDUSTRIES, INC.

## (undated) DEVICE — #15 STERILE STAINLESS BLADE: Brand: STERILE STAINLESS BLADES

## (undated) DEVICE — THIN OFFSET (13.0 X 0.38 X 39.0MM)

## (undated) DEVICE — YANKAUER,FLEXIBLE HANDLE,REGLR CAPACITY: Brand: MEDLINE INDUSTRIES, INC.

## (undated) DEVICE — MEDI-VAC NON-CONDUCTIVE SUCTION TUBING: Brand: CARDINAL HEALTH

## (undated) DEVICE — SUT FBRWR 2 38IN N ABSRB BLU L26.5MM 1/2

## (undated) DEVICE — UNDYED BRAIDED (POLYGLACTIN 910), SYNTHETIC ABSORBABLE SUTURE: Brand: COATED VICRYL

## (undated) DEVICE — BIT DRL 110MM 3.5MM QC FOR PRO-PK CLAV MID

## (undated) DEVICE — PADDING CAST W4INXL4YD ST COHESIVE LP

## (undated) DEVICE — INTENDED TO AID IN THE PASSING OF SUTURES THROUGH BONE AND SOFT TISSUE DURING ORTHOPEDIC SURGERY: Brand: HOFFEE SUTURE RETRIEVER

## (undated) DEVICE — GAUZE,SPONGE,4"X4",12PLY,STERILE,LF,2'S: Brand: MEDLINE

## (undated) DEVICE — Device

## (undated) DEVICE — DRESSING ADAPTIC L16XW3IN OIL ADH

## (undated) DEVICE — GLOVE SUR 8.5 SENSICARE PI PIP CRM PWD F

## (undated) DEVICE — SOLUTION IRRIG 1000ML 0.9% NACL USP BTL

## (undated) DEVICE — PADDING CAST 6INX4YD 100% COT ABSRB HIGHLY

## (undated) DEVICE — C-ARM: Brand: UNBRANDED

## (undated) DEVICE — SYRINGE BULB 50/CS 48/PLT: Brand: MEDEGEN MEDICAL PRODUCTS, LLC

## (undated) DEVICE — BIT DRL 2.5X110MM G QC FOR LOK COMPR

## (undated) DEVICE — SUT ETHLN 3-0 30IN PSL NABSRB BLK 30MM 3/8 CI

## (undated) DEVICE — GLOVE SUR 8.5 SENSICARE PI PIP GRN PWD F

## (undated) DEVICE — SUT COAT VCRL 2-0 27IN SH ABSRB UD 26MM 1/2

## (undated) DEVICE — SUT COAT VCRL 3-0 27IN ABSRB UD 26MM CT-2

## (undated) DEVICE — SUT ETHLN 3-0 18IN PS-2 NABSRB BLK 19MM 3/8 C

## (undated) DEVICE — ZZ--DISC-SUB-301260-SUT ETHLN 3-0 30IN FSL NABSRB BLK 30MM 3/8 CI

## (undated) DEVICE — ANTIBACTERIAL UNDYED BRAIDED (POLYGLACTIN 910), SYNTHETIC ABSORBABLE SUTURE: Brand: COATED VICRYL

## (undated) DEVICE — INTENDED TO BE USED TO OCCLUDE, RETRACT AND IDENTIFY ARTERIES, VEINS, TENDONS AND NERVES IN SURGICAL PROCEDURES: Brand: STERION®  VESSEL LOOP

## (undated) DEVICE — BANDAGE,COHESIVE,TAN,4X5YD,LF,STRL: Brand: MEDLINE

## (undated) DEVICE — BANDAGE CAST 5X30IN HT PLSTR X FAST SET

## (undated) DEVICE — VIOLET BRAIDED (POLYGLACTIN 910), SYNTHETIC ABSORBABLE SUTURE: Brand: COATED VICRYL